# Patient Record
Sex: FEMALE | Race: WHITE | NOT HISPANIC OR LATINO | Employment: FULL TIME | URBAN - METROPOLITAN AREA
[De-identification: names, ages, dates, MRNs, and addresses within clinical notes are randomized per-mention and may not be internally consistent; named-entity substitution may affect disease eponyms.]

---

## 2017-01-23 ENCOUNTER — ALLSCRIPTS OFFICE VISIT (OUTPATIENT)
Dept: OTHER | Facility: OTHER | Age: 47
End: 2017-01-23

## 2018-01-13 VITALS
HEART RATE: 80 BPM | SYSTOLIC BLOOD PRESSURE: 118 MMHG | WEIGHT: 162.38 LBS | DIASTOLIC BLOOD PRESSURE: 82 MMHG | BODY MASS INDEX: 28.76 KG/M2

## 2019-01-09 ENCOUNTER — TELEPHONE (OUTPATIENT)
Dept: INTERNAL MEDICINE CLINIC | Facility: CLINIC | Age: 49
End: 2019-01-09

## 2019-04-25 ENCOUNTER — TELEPHONE (OUTPATIENT)
Dept: GASTROENTEROLOGY | Facility: CLINIC | Age: 49
End: 2019-04-25

## 2019-05-23 ENCOUNTER — TELEPHONE (OUTPATIENT)
Dept: GASTROENTEROLOGY | Facility: CLINIC | Age: 49
End: 2019-05-23

## 2019-06-21 ENCOUNTER — TRANSCRIBE ORDERS (OUTPATIENT)
Dept: LAB | Facility: CLINIC | Age: 49
End: 2019-06-21

## 2019-06-21 ENCOUNTER — APPOINTMENT (OUTPATIENT)
Dept: LAB | Facility: CLINIC | Age: 49
End: 2019-06-21
Payer: COMMERCIAL

## 2019-06-21 ENCOUNTER — OFFICE VISIT (OUTPATIENT)
Dept: INTERNAL MEDICINE CLINIC | Facility: CLINIC | Age: 49
End: 2019-06-21
Payer: COMMERCIAL

## 2019-06-21 VITALS
HEIGHT: 63 IN | TEMPERATURE: 98 F | SYSTOLIC BLOOD PRESSURE: 116 MMHG | DIASTOLIC BLOOD PRESSURE: 86 MMHG | BODY MASS INDEX: 31.08 KG/M2 | WEIGHT: 175.4 LBS | HEART RATE: 88 BPM

## 2019-06-21 DIAGNOSIS — K90.49 FOOD INTOLERANCE: ICD-10-CM

## 2019-06-21 DIAGNOSIS — R10.9 ABDOMINAL PAIN, UNSPECIFIED ABDOMINAL LOCATION: ICD-10-CM

## 2019-06-21 DIAGNOSIS — R10.9 ABDOMINAL PAIN, UNSPECIFIED ABDOMINAL LOCATION: Primary | ICD-10-CM

## 2019-06-21 LAB
ALBUMIN SERPL BCP-MCNC: 3.9 G/DL (ref 3.5–5)
ALP SERPL-CCNC: 57 U/L (ref 46–116)
ALT SERPL W P-5'-P-CCNC: 42 U/L (ref 12–78)
ANION GAP SERPL CALCULATED.3IONS-SCNC: 3 MMOL/L (ref 4–13)
AST SERPL W P-5'-P-CCNC: 22 U/L (ref 5–45)
BASOPHILS # BLD AUTO: 0.04 THOUSANDS/ΜL (ref 0–0.1)
BASOPHILS NFR BLD AUTO: 1 % (ref 0–1)
BILIRUB SERPL-MCNC: 0.48 MG/DL (ref 0.2–1)
BILIRUB UR QL STRIP: NEGATIVE
BUN SERPL-MCNC: 10 MG/DL (ref 5–25)
CALCIUM SERPL-MCNC: 9 MG/DL (ref 8.3–10.1)
CHLORIDE SERPL-SCNC: 104 MMOL/L (ref 100–108)
CHOLEST SERPL-MCNC: 186 MG/DL (ref 50–200)
CLARITY UR: NORMAL
CO2 SERPL-SCNC: 27 MMOL/L (ref 21–32)
COLOR UR: YELLOW
CREAT SERPL-MCNC: 0.74 MG/DL (ref 0.6–1.3)
EOSINOPHIL # BLD AUTO: 0.11 THOUSAND/ΜL (ref 0–0.61)
EOSINOPHIL NFR BLD AUTO: 2 % (ref 0–6)
ERYTHROCYTE [DISTWIDTH] IN BLOOD BY AUTOMATED COUNT: 12.5 % (ref 11.6–15.1)
GFR SERPL CREATININE-BSD FRML MDRD: 97 ML/MIN/1.73SQ M
GLUCOSE P FAST SERPL-MCNC: 93 MG/DL (ref 65–99)
GLUCOSE UR STRIP-MCNC: NEGATIVE MG/DL
HCT VFR BLD AUTO: 38.4 % (ref 34.8–46.1)
HDLC SERPL-MCNC: 62 MG/DL (ref 40–60)
HGB BLD-MCNC: 12.6 G/DL (ref 11.5–15.4)
HGB UR QL STRIP.AUTO: NEGATIVE
IMM GRANULOCYTES # BLD AUTO: 0.02 THOUSAND/UL (ref 0–0.2)
IMM GRANULOCYTES NFR BLD AUTO: 0 % (ref 0–2)
KETONES UR STRIP-MCNC: NEGATIVE MG/DL
LDLC SERPL CALC-MCNC: 87 MG/DL (ref 0–100)
LEUKOCYTE ESTERASE UR QL STRIP: NEGATIVE
LIPASE SERPL-CCNC: 114 U/L (ref 73–393)
LYMPHOCYTES # BLD AUTO: 1.26 THOUSANDS/ΜL (ref 0.6–4.47)
LYMPHOCYTES NFR BLD AUTO: 20 % (ref 14–44)
MCH RBC QN AUTO: 31.3 PG (ref 26.8–34.3)
MCHC RBC AUTO-ENTMCNC: 32.8 G/DL (ref 31.4–37.4)
MCV RBC AUTO: 95 FL (ref 82–98)
MONOCYTES # BLD AUTO: 0.59 THOUSAND/ΜL (ref 0.17–1.22)
MONOCYTES NFR BLD AUTO: 9 % (ref 4–12)
NEUTROPHILS # BLD AUTO: 4.41 THOUSANDS/ΜL (ref 1.85–7.62)
NEUTS SEG NFR BLD AUTO: 68 % (ref 43–75)
NITRITE UR QL STRIP: NEGATIVE
NONHDLC SERPL-MCNC: 124 MG/DL
NRBC BLD AUTO-RTO: 0 /100 WBCS
PH UR STRIP.AUTO: 6 [PH]
PLATELET # BLD AUTO: 331 THOUSANDS/UL (ref 149–390)
PMV BLD AUTO: 10.7 FL (ref 8.9–12.7)
POTASSIUM SERPL-SCNC: 3.7 MMOL/L (ref 3.5–5.3)
PROT SERPL-MCNC: 7.8 G/DL (ref 6.4–8.2)
PROT UR STRIP-MCNC: NEGATIVE MG/DL
RBC # BLD AUTO: 4.03 MILLION/UL (ref 3.81–5.12)
SODIUM SERPL-SCNC: 134 MMOL/L (ref 136–145)
SP GR UR STRIP.AUTO: 1.02 (ref 1–1.03)
TRIGL SERPL-MCNC: 184 MG/DL
TSH SERPL DL<=0.05 MIU/L-ACNC: 2.47 UIU/ML (ref 0.36–3.74)
UROBILINOGEN UR QL STRIP.AUTO: 0.2 E.U./DL
WBC # BLD AUTO: 6.43 THOUSAND/UL (ref 4.31–10.16)

## 2019-06-21 PROCEDURE — 36415 COLL VENOUS BLD VENIPUNCTURE: CPT

## 2019-06-21 PROCEDURE — 84443 ASSAY THYROID STIM HORMONE: CPT

## 2019-06-21 PROCEDURE — 85025 COMPLETE CBC W/AUTO DIFF WBC: CPT

## 2019-06-21 PROCEDURE — 3008F BODY MASS INDEX DOCD: CPT | Performed by: PHYSICIAN ASSISTANT

## 2019-06-21 PROCEDURE — 80061 LIPID PANEL: CPT

## 2019-06-21 PROCEDURE — 99214 OFFICE O/P EST MOD 30 MIN: CPT | Performed by: PHYSICIAN ASSISTANT

## 2019-06-21 PROCEDURE — 80053 COMPREHEN METABOLIC PANEL: CPT

## 2019-06-21 PROCEDURE — 83690 ASSAY OF LIPASE: CPT

## 2019-06-21 PROCEDURE — 81003 URINALYSIS AUTO W/O SCOPE: CPT | Performed by: PHYSICIAN ASSISTANT

## 2020-10-09 ENCOUNTER — OFFICE VISIT (OUTPATIENT)
Dept: FAMILY MEDICINE CLINIC | Facility: CLINIC | Age: 50
End: 2020-10-09
Payer: COMMERCIAL

## 2020-10-09 VITALS
SYSTOLIC BLOOD PRESSURE: 128 MMHG | DIASTOLIC BLOOD PRESSURE: 78 MMHG | WEIGHT: 159.8 LBS | HEIGHT: 63 IN | TEMPERATURE: 98.1 F | OXYGEN SATURATION: 99 % | BODY MASS INDEX: 28.31 KG/M2 | HEART RATE: 89 BPM

## 2020-10-09 DIAGNOSIS — T78.40XS ALLERGY, SEQUELA: ICD-10-CM

## 2020-10-09 DIAGNOSIS — Z12.31 ENCOUNTER FOR SCREENING MAMMOGRAM FOR MALIGNANT NEOPLASM OF BREAST: Primary | ICD-10-CM

## 2020-10-09 DIAGNOSIS — Z00.00 ANNUAL PHYSICAL EXAM: ICD-10-CM

## 2020-10-09 DIAGNOSIS — T78.40XS ALLERGIC DISORDER, SEQUELA: ICD-10-CM

## 2020-10-09 DIAGNOSIS — L81.2 FRECKLED SKIN: ICD-10-CM

## 2020-10-09 PROCEDURE — 1036F TOBACCO NON-USER: CPT | Performed by: STUDENT IN AN ORGANIZED HEALTH CARE EDUCATION/TRAINING PROGRAM

## 2020-10-09 PROCEDURE — 3725F SCREEN DEPRESSION PERFORMED: CPT | Performed by: STUDENT IN AN ORGANIZED HEALTH CARE EDUCATION/TRAINING PROGRAM

## 2020-10-09 PROCEDURE — 99396 PREV VISIT EST AGE 40-64: CPT | Performed by: STUDENT IN AN ORGANIZED HEALTH CARE EDUCATION/TRAINING PROGRAM

## 2020-10-14 ENCOUNTER — TELEPHONE (OUTPATIENT)
Dept: FAMILY MEDICINE CLINIC | Facility: CLINIC | Age: 50
End: 2020-10-14

## 2020-10-14 DIAGNOSIS — L29.9 PRURITUS: Primary | ICD-10-CM

## 2020-10-14 LAB
A ALTERNATA IGE QN: <0.1 KU/L
ALMOND IGE QN: <0.1 KU/L
C HERBARUM IGE QN: <0.1 KU/L
CASHEW NUT IGE QN: <0.1 KU/L
CAT DANDER IGE QN: <0.1 KU/L
CODFISH IGE QN: <0.1 KU/L
COMMON RAGWEED IGE QN: <0.1 KU/L
D FARINAE IGE QN: 1.08 KU/L
DEPRECATED A ALTERNATA IGE RAST QL: 0
DEPRECATED ALMOND IGE RAST QL: 0
DEPRECATED C HERBARUM IGE RAST QL: 0
DEPRECATED CASHEW NUT IGE RAST QL: 0
DEPRECATED CAT DANDER IGE RAST QL: 0
DEPRECATED CODFISH IGE RAST QL: 0
DEPRECATED COMMON RAGWEED IGE RAST QL: 0
DEPRECATED D FARINAE IGE RAST QL: 2
DEPRECATED DOG DANDER IGE RAST QL: 0
DEPRECATED EGG WHITE IGE RAST QL: 0
DEPRECATED GOOSEFOOT IGE RAST QL: 0
DEPRECATED HAZELNUT IGE RAST QL: 0
DEPRECATED KENT BLUE GRASS IGE RAST QL: 0
DEPRECATED MILK IGE RAST QL: ABNORMAL
DEPRECATED PEANUT IGE RAST QL: 0
DEPRECATED SALMON IGE RAST QL: 0
DEPRECATED SCALLOP IGE RAST QL: 1
DEPRECATED SESAME SEED IGE RAST QL: 0
DEPRECATED SHRIMP IGE RAST QL: 2
DEPRECATED SOYBEAN IGE RAST QL: 0
DEPRECATED TIMOTHY IGE RAST QL: 0
DEPRECATED TUNA IGE RAST QL: 0
DEPRECATED WALNUT IGE RAST QL: 0
DEPRECATED WHEAT IGE RAST QL: 0
DEPRECATED WHITE OAK IGE RAST QL: 0
DOG DANDER IGE QN: <0.1 KU/L
EGG WHITE IGE QN: <0.1 KU/L
GOOSEFOOT IGE QN: <0.1 KU/L
HAZELNUT IGE QN: <0.1 KU/L
KENT BLUE GRASS IGE QN: <0.1 KU/L
MILK IGE QN: 0.16 KU/L
PEANUT IGE QN: <0.1 KU/L
SALMON IGE QN: <0.1 KU/L
SCALLOP IGE QN: 0.54 KU/L
SESAME SEED IGE QN: <0.1 KU/L
SHRIMP IGE QN: 1.3 KU/L
SOYBEAN IGE QN: <0.1 KU/L
TIMOTHY IGE QN: <0.1 KU/L
TUNA IGE QN: <0.1 KU/L
WALNUT IGE QN: <0.1 KU/L
WHEAT IGE QN: <0.1 KU/L
WHITE OAK IGE QN: <0.1 KU/L

## 2020-10-14 RX ORDER — HYDROXYZINE HYDROCHLORIDE 25 MG/1
25 TABLET, FILM COATED ORAL EVERY 6 HOURS PRN
Qty: 90 TABLET | Refills: 0 | Status: SHIPPED | OUTPATIENT
Start: 2020-10-14 | End: 2020-10-29

## 2020-10-29 DIAGNOSIS — L29.9 PRURITUS: ICD-10-CM

## 2020-10-29 RX ORDER — HYDROXYZINE HYDROCHLORIDE 25 MG/1
25 TABLET, FILM COATED ORAL EVERY 6 HOURS PRN
Qty: 90 TABLET | Refills: 0 | Status: SHIPPED | OUTPATIENT
Start: 2020-10-29 | End: 2020-11-04

## 2020-11-04 PROBLEM — L50.1 CHRONIC IDIOPATHIC URTICARIA: Status: ACTIVE | Noted: 2020-11-04

## 2021-01-12 ENCOUNTER — TELEPHONE (OUTPATIENT)
Dept: FAMILY MEDICINE CLINIC | Facility: CLINIC | Age: 51
End: 2021-01-12

## 2021-01-12 NOTE — TELEPHONE ENCOUNTER
Patient called office stating she was seen here few months ago for a rash   Patient states the treatment plan she was given at the time of visit did not help her  Patient states she has now found dicyclomine in her medicine cabinet and have been taking it  It's now helping her rash get better she states  Patient states she's running low and would like for it to get refilled   After looking into chart I don't see this medication in chart  I told patient she needs an appointment in office patient declined stating she does not want a appointment due to us not helping her rash the first time

## 2021-01-28 ENCOUNTER — TELEPHONE (OUTPATIENT)
Dept: FAMILY MEDICINE CLINIC | Facility: CLINIC | Age: 51
End: 2021-01-28

## 2021-01-28 ENCOUNTER — OFFICE VISIT (OUTPATIENT)
Dept: FAMILY MEDICINE CLINIC | Facility: CLINIC | Age: 51
End: 2021-01-28
Payer: COMMERCIAL

## 2021-01-28 VITALS
WEIGHT: 160 LBS | HEART RATE: 74 BPM | HEIGHT: 63 IN | TEMPERATURE: 98 F | OXYGEN SATURATION: 100 % | DIASTOLIC BLOOD PRESSURE: 74 MMHG | BODY MASS INDEX: 28.35 KG/M2 | SYSTOLIC BLOOD PRESSURE: 122 MMHG

## 2021-01-28 DIAGNOSIS — L50.9 URTICARIA OF UNKNOWN ORIGIN: Primary | ICD-10-CM

## 2021-01-28 DIAGNOSIS — L50.1 CHRONIC IDIOPATHIC URTICARIA: ICD-10-CM

## 2021-01-28 PROCEDURE — 3725F SCREEN DEPRESSION PERFORMED: CPT | Performed by: STUDENT IN AN ORGANIZED HEALTH CARE EDUCATION/TRAINING PROGRAM

## 2021-01-28 PROCEDURE — 3008F BODY MASS INDEX DOCD: CPT | Performed by: STUDENT IN AN ORGANIZED HEALTH CARE EDUCATION/TRAINING PROGRAM

## 2021-01-28 PROCEDURE — 99213 OFFICE O/P EST LOW 20 MIN: CPT | Performed by: STUDENT IN AN ORGANIZED HEALTH CARE EDUCATION/TRAINING PROGRAM

## 2021-01-28 PROCEDURE — 1036F TOBACCO NON-USER: CPT | Performed by: STUDENT IN AN ORGANIZED HEALTH CARE EDUCATION/TRAINING PROGRAM

## 2021-01-28 RX ORDER — DOXYCYCLINE HYCLATE 100 MG/1
100 CAPSULE ORAL EVERY 12 HOURS SCHEDULED
Qty: 28 CAPSULE | Refills: 0 | Status: SHIPPED | OUTPATIENT
Start: 2021-01-28 | End: 2021-02-11

## 2021-01-28 NOTE — PROGRESS NOTES
Assessment/Plan:         Problem List Items Addressed This Visit        Musculoskeletal and Integument    Chronic idiopathic urticaria      Other Visit Diagnoses     Urticaria of unknown origin    -  Primary    Relevant Medications    doxycycline hyclate (VIBRAMYCIN) 100 mg capsule        Given the inflammatory nature and improvement with doxy, will do a short course  Discussed if no improvement, will need follow up with derm     Subjective:      Patient ID: Felipe Matamoros is a 52 y o  female  Rash  This is a chronic problem  The current episode started more than 1 year ago  The problem has been gradually improving since onset  The rash is diffuse  The rash is characterized by dryness, itchiness, redness and swelling  It is unknown if there was an exposure to a precipitant  Pertinent negatives include no anorexia, congestion, cough, diarrhea, eye pain, facial edema, fatigue, fever, joint pain, nail changes, rhinorrhea, shortness of breath, sore throat or vomiting  Past treatments include antibiotics and moisturizer  The treatment provided significant relief  Her past medical history is significant for allergies  There is no history of asthma, eczema or varicella  The following portions of the patient's history were reviewed and updated as appropriate:   Past Medical History:  She has a past medical history of Anemia, Heartburn, History of chickenpox, Hives, Pneumonia, Wears contact lenses, and Wears glasses  ,  _______________________________________________________________________  Medical Problems:  does not have any pertinent problems on file ,  _______________________________________________________________________  Past Surgical History:   has a past surgical history that includes  section; Knee surgery; Breast surgery; Abdominal surgery; Tonsillectomy and adenoidectomy (2015); Colonoscopy;  [place] tooth extraction; and Tubal ligation  ,  _______________________________________________________________________  Family History:  family history includes Alcohol abuse in her daughter; Cancer in her mother; Colon cancer in her mother; Crohn's disease in her sister; No Known Problems in her father; Other in her daughter; Psoriasis in her sister  ,  _______________________________________________________________________  Social History:   reports that she quit smoking about 7 years ago  Her smoking use included cigarettes  She has a 25 00 pack-year smoking history  She has never used smokeless tobacco  She reports current alcohol use  She reports that she does not use drugs  ,  _______________________________________________________________________  Allergies:  has No Known Allergies     _______________________________________________________________________  Current Outpatient Medications   Medication Sig Dispense Refill    doxycycline hyclate (VIBRAMYCIN) 100 mg capsule Take 1 capsule (100 mg total) by mouth every 12 (twelve) hours for 14 days 28 capsule 0    Quercetin 250 MG TABS Take 1 tablet (250 mg total) by mouth 2 (two) times a day 60 tablet 11    saccharomyces boulardii (FLORASTOR) 250 mg capsule Take 250 mg by mouth 2 (two) times a day       No current facility-administered medications for this visit       _______________________________________________________________________  Review of Systems   Constitutional: Negative for fatigue and fever  HENT: Negative for congestion, rhinorrhea and sore throat  Eyes: Negative for pain  Respiratory: Negative for cough and shortness of breath  Gastrointestinal: Negative for anorexia, diarrhea and vomiting  Musculoskeletal: Negative for joint pain  Skin: Positive for rash  Negative for nail changes           Objective:  Vitals:    01/28/21 0810   BP: 122/74   BP Location: Left arm   Patient Position: Sitting   Pulse: 74   Temp: 98 °F (36 7 °C)   TempSrc: Tympanic   SpO2: 100% Weight: 72 6 kg (160 lb)   Height: 5' 3" (1 6 m)     Body mass index is 28 34 kg/m²  Physical Exam  Constitutional:       General: She is not in acute distress  Appearance: Normal appearance  She is normal weight  She is not ill-appearing  Skin:     Coloration: Skin is not ashen, cyanotic, jaundiced, mottled, pale or sallow  Findings: Rash present  Rash is urticarial       Comments: Healing urticarial rash on arms and trunk, inflammatory changes now Absent   Neurological:      General: No focal deficit present  Mental Status: She is alert and oriented to person, place, and time  Mental status is at baseline     Psychiatric:         Mood and Affect: Mood normal

## 2021-01-28 NOTE — TELEPHONE ENCOUNTER
Pt arrived for appt today and her insurance has her assigned to a different doctor  Pt will call insurance to change PCP  Aware without changing she will be charged for her visit

## 2021-04-29 ENCOUNTER — TELEPHONE (OUTPATIENT)
Dept: FAMILY MEDICINE CLINIC | Facility: CLINIC | Age: 51
End: 2021-04-29

## 2021-04-29 NOTE — TELEPHONE ENCOUNTER
T/c from patient stating she has been breaking out in hives  She has decreased her carbs and hives are much better  She is wondering if she could be tested for celiac disease  She would appreciate one of our other providers to review in Dr Gillian Brito absence  Thank you

## 2021-04-30 NOTE — TELEPHONE ENCOUNTER
Celiac disease does not typically cause hives, more associated with dermatitis herpetiformis rash  Per Dr Vipin Beltrán last note, patient should follow up with derm if things are not improving  She can discuss further with Dr Vipin Beltrán if she has other symptoms that are concerning her for celiac disease       DO [facility]  4/30/2021 7:56 AM

## 2021-04-30 NOTE — TELEPHONE ENCOUNTER
Pt called regarding labs being ordered to test her for celiac disease  Upon providing pt with Dr Valerio Saenz advisement, pt got very upset that blood test would not be ordered at this time  I asked pt if this could wait until Dr Lexi Nance returns on Monday? Pt states she would like a call from Dr Lexi Nance and hung up on me

## 2021-06-03 ENCOUNTER — TRANSCRIBE ORDERS (OUTPATIENT)
Dept: ADMINISTRATIVE | Facility: HOSPITAL | Age: 51
End: 2021-06-03

## 2021-06-03 DIAGNOSIS — R60.9 SWELLING: Primary | ICD-10-CM

## 2021-06-10 ENCOUNTER — HOSPITAL ENCOUNTER (OUTPATIENT)
Dept: ULTRASOUND IMAGING | Facility: CLINIC | Age: 51
Discharge: HOME | End: 2021-06-10
Payer: COMMERCIAL

## 2021-06-10 DIAGNOSIS — R60.9 SWELLING: ICD-10-CM

## 2021-06-10 PROCEDURE — 76536 US EXAM OF HEAD AND NECK: CPT

## 2021-08-26 ENCOUNTER — OFFICE VISIT (OUTPATIENT)
Dept: GASTROENTEROLOGY | Facility: CLINIC | Age: 51
End: 2021-08-26
Payer: COMMERCIAL

## 2021-08-26 ENCOUNTER — TELEPHONE (OUTPATIENT)
Dept: GASTROENTEROLOGY | Facility: CLINIC | Age: 51
End: 2021-08-26

## 2021-08-26 VITALS
HEIGHT: 63 IN | DIASTOLIC BLOOD PRESSURE: 82 MMHG | SYSTOLIC BLOOD PRESSURE: 120 MMHG | HEART RATE: 77 BPM | WEIGHT: 147 LBS | BODY MASS INDEX: 26.05 KG/M2

## 2021-08-26 DIAGNOSIS — R19.7 DIARRHEA, UNSPECIFIED TYPE: Primary | ICD-10-CM

## 2021-08-26 DIAGNOSIS — K21.9 GASTROESOPHAGEAL REFLUX DISEASE WITHOUT ESOPHAGITIS: ICD-10-CM

## 2021-08-26 DIAGNOSIS — R09.89 GLOBUS SENSATION: ICD-10-CM

## 2021-08-26 PROCEDURE — 3008F BODY MASS INDEX DOCD: CPT | Performed by: PHYSICIAN ASSISTANT

## 2021-08-26 PROCEDURE — 99214 OFFICE O/P EST MOD 30 MIN: CPT | Performed by: PHYSICIAN ASSISTANT

## 2021-08-26 PROCEDURE — 1036F TOBACCO NON-USER: CPT | Performed by: PHYSICIAN ASSISTANT

## 2021-08-26 RX ORDER — FAMOTIDINE 20 MG/1
20 TABLET, FILM COATED ORAL 2 TIMES DAILY
Qty: 60 TABLET | Refills: 1 | Status: SHIPPED | OUTPATIENT
Start: 2021-08-26 | End: 2021-09-25

## 2021-08-26 NOTE — TELEPHONE ENCOUNTER
Spoke with [facility], they stated her insurance will not cover the medication at all because the pt can buy it otc

## 2021-08-26 NOTE — PROGRESS NOTES
[facility] - Outpatient Follow-up Note  Darren Piedra 54 y o  female MRN: [id number]  Encounter: [id number]          ASSESSMENT AND PLAN:      1  Gastroesophageal reflux disease without esophagitis  2  Globus sensation  3  Diarrhea    Patient reports of struggling with a globus sensation for months  She tried Prilosec which caused headaches  She also has a long history of diarrhea x years and has a sister with crohn's disease and mother with colon cancer  She had a colonoscopy with Dr Ted Montes for this complaint within the past 2 years which she reports was normal   She also had extensive food allergy testing which came back for numerous food allergies which she is now avoiding  It did include an allergy to gluten  She has been gluten free for a couple of months now with a dramatic improvement in her diarrhea  Will plan for EGD to investigate for esophagitis, ulcers, etc  - will bx the stomach for h pylori and duodenum  We did review the evaluation for celiac disease with celiac serology and duodenal biopsies which would require her to return to a gluten-rich diet for accurate testing  Given that she was found to have an allergy to gluten and has dramatically improved with a gluten-free diet, she would like to avoid returning to eating gluten for that testing  Recommend to continue a strict gluten free diet  Will check SIBO testing and CT enterography given family history of crohn's disease  Will begin a Pepcid 20mg po BID x 6 weeks course for GERD  Follow up after above testing   ______________________________________________________________________    SUBJECTIVE:  Patient is a 54year old female who presents to the office for a gastrointestinal evaluation  Patient reports that she has been struggling with a globus sensation for months  She tried Prilosec which caused headaches    She also has a long history of diarrhea x years and has a sister with crohn's disease and mother with colon cancer  She had a colonoscopy with Dr Shawn Grace for this complaint within the past 2 years which she reports was normal   She also had extensive food allergy testing from an outside lab which came back for numerous food allergies which she is now avoiding  It did include an allergy to gluten  She has been gluten free for a couple of months now with a dramatic improvement in her diarrhea  She reports she had been struggling with recurrent hives as well which prompted her food allergy testing  She does report some abdominal bloating and discomfort at times  She reports she would like to be tested for h pylori and SIBO  REVIEW OF SYSTEMS IS OTHERWISE NEGATIVE        Historical Information   Past Medical History:   Diagnosis Date    Anemia     resolved    Heartburn     History of chickenpox     Hives     Pneumonia     Wears contact lenses     Wears glasses      Past Surgical History:   Procedure Laterality Date    ABDOMINAL SURGERY      Tummy Tuck ( Abdominal Plasty)    BREAST SURGERY       SECTION      COLONOSCOPY      KNEE SURGERY      TONSILLECTOMY AND ADENOIDECTOMY  2015    Last assessed    TUBAL LIGATION      WISDOM TOOTH EXTRACTION      x4     Social History   Social History     Substance and Sexual Activity   Alcohol Use Yes     Social History     Substance and Sexual Activity   Drug Use Never     Social History     Tobacco Use   Smoking Status Former Smoker    Packs/day: 1 00    Years: 25 00    Pack years: 25 00    Types: Cigarettes    Quit date:     Years since quittin 6   Smokeless Tobacco Never Used   Tobacco Comment    Quit 9 years      Family History   Problem Relation Age of Onset   [facility] Cancer Mother     Colon cancer Mother    Rene Ventura Crohn's disease Sister     Psoriasis Sister     No Known Problems Father     Alcohol abuse Daughter     Other Daughter         sensitive to bug bites        Meds/Allergies       Current IgE 10/09/2020 <0 10     Class 10/09/2020 0     S922-OmZ [place] 10/09/2020 <0 10     Class 10/09/2020 0     Codfish IgE 10/09/2020 <0 10     Class 10/09/2020 0     Cow's Milk (F2) IgE 10/09/2020 0 16*    Class 10/09/2020 0/1     C663-CrY Soybean 10/09/2020 <0 10     Class 10/09/2020 0     Shrimp IgE 10/09/2020 1 30*    Class 10/09/2020 2     SCALLOP IGE 10/09/2020 0 54*    Class 10/09/2020 1     W665-StX Sesame Seed 10/09/2020 <0 10     Class 10/09/2020 0     H301-KcO Hazelnut (Filbe* 10/09/2020 <0 10     Class 10/09/2020 0     K347-KzC Cashew Nut 10/09/2020 <0 10     Class 10/09/2020 0     C085-XvS [place] 10/09/2020 <0 10     Class 10/09/2020 0     [place] 10/09/2020 <0 10     Class 10/09/2020 0     Tuna 10/09/2020 <0 10     Class 10/09/2020 0          Radiology Results:   No results found

## 2021-08-26 NOTE — H&P (VIEW-ONLY)
[facility] - Outpatient Follow-up Note  Val Santos 54 y o  female MRN: [id number]  Encounter: [id number]          ASSESSMENT AND PLAN:      1  Gastroesophageal reflux disease without esophagitis  2  Globus sensation  3  Diarrhea    Patient reports of struggling with a globus sensation for months  She tried Prilosec which caused headaches  She also has a long history of diarrhea x years and has a sister with crohn's disease and mother with colon cancer  She had a colonoscopy with Dr Ashley Chino for this complaint within the past 2 years which she reports was normal   She also had extensive food allergy testing which came back for numerous food allergies which she is now avoiding  It did include an allergy to gluten  She has been gluten free for a couple of months now with a dramatic improvement in her diarrhea  Will plan for EGD to investigate for esophagitis, ulcers, etc  - will bx the stomach for h pylori and duodenum  We did review the evaluation for celiac disease with celiac serology and duodenal biopsies which would require her to return to a gluten-rich diet for accurate testing  Given that she was found to have an allergy to gluten and has dramatically improved with a gluten-free diet, she would like to avoid returning to eating gluten for that testing  Recommend to continue a strict gluten free diet  Will check SIBO testing and CT enterography given family history of crohn's disease  Will begin a Pepcid 20mg po BID x 6 weeks course for GERD  Follow up after above testing   ______________________________________________________________________    SUBJECTIVE:  Patient is a 54year old female who presents to the office for a gastrointestinal evaluation  Patient reports that she has been struggling with a globus sensation for months  She tried Prilosec which caused headaches    She also has a long history of diarrhea x years and has a sister with crohn's disease and mother with colon cancer  She had a colonoscopy with Dr Sia Fernandez for this complaint within the past 2 years which she reports was normal   She also had extensive food allergy testing from an outside lab which came back for numerous food allergies which she is now avoiding  It did include an allergy to gluten  She has been gluten free for a couple of months now with a dramatic improvement in her diarrhea  She reports she had been struggling with recurrent hives as well which prompted her food allergy testing  She does report some abdominal bloating and discomfort at times  She reports she would like to be tested for h pylori and SIBO  REVIEW OF SYSTEMS IS OTHERWISE NEGATIVE        Historical Information   Past Medical History:   Diagnosis Date    Anemia     resolved    Heartburn     History of chickenpox     Hives     Pneumonia     Wears contact lenses     Wears glasses      Past Surgical History:   Procedure Laterality Date    ABDOMINAL SURGERY      Tummy Tuck ( Abdominal Plasty)    BREAST SURGERY       SECTION      COLONOSCOPY      KNEE SURGERY      TONSILLECTOMY AND ADENOIDECTOMY  2015    Last assessed    TUBAL LIGATION      WISDOM TOOTH EXTRACTION      x4     Social History   Social History     Substance and Sexual Activity   Alcohol Use Yes     Social History     Substance and Sexual Activity   Drug Use Never     Social History     Tobacco Use   Smoking Status Former Smoker    Packs/day: 1 00    Years: 25 00    Pack years: 25 00    Types: Cigarettes    Quit date:     Years since quittin 6   Smokeless Tobacco Never Used   Tobacco Comment    Quit 9 years      Family History   Problem Relation Age of Onset   [facility] Cancer Mother     Colon cancer Mother    [facility] Crohn's disease Sister     Psoriasis Sister     No Known Problems Father     Alcohol abuse Daughter     Other Daughter         sensitive to bug bites        Meds/Allergies       Current Outpatient Medications:     VITAMIN D PO    famotidine (PEPCID) 20 mg tablet    Quercetin 250 MG TABS    saccharomyces boulardii (FLORASTOR) 250 mg capsule    No Known Allergies        Objective     Blood pressure 120/82, pulse 77, height 5' 3" (1 6 m), weight 66 7 kg (147 lb)  Body mass index is 26 04 kg/m²  PHYSICAL EXAM:      General Appearance:   Alert, cooperative, no distress   HEENT:   Normocephalic, atraumatic, anicteric    Neck:  Supple, symmetrical, trachea midline   Lungs:   Clear to auscultation bilaterally; no rales, rhonchi or wheezing; respirations unlabored    Heart[de-identified]   Regular rate and rhythm; no murmur, rub, or gallop  Abdomen:   Soft, non-tender, non-distended; normal bowel sounds; no masses, no organomegaly    Genitalia:   Deferred    Rectal:   Deferred    Extremities:  No cyanosis, clubbing or edema    Pulses:  2+ and symmetric    Skin:  No jaundice, rashes, or lesions    Lymph nodes:  No palpable cervical lymphadenopathy        Lab Results:   No visits with results within 1 Day(s) from this visit  Latest known visit with results is:   Orders Only on 10/09/2020   Component Date Value    Alternaria Alternata 10/09/2020 <0 10     Class 10/09/2020 0     Cat Dander 10/09/2020 <0 10     Class 10/09/2020 0     Cladosporium Herbarum 10/09/2020 <0 10     Class 10/09/2020 0     Short Ragwd(A sharri ) * 10/09/2020 <0 10     Class 10/09/2020 0     D   FARINAE 10/09/2020 1 08*    Class 10/09/2020 2     DOG DANDER IGE 10/09/2020 <0 10     Class 10/09/2020 0     [place] 10/09/2020 <0 10     Class 10/09/2020 0     M866-ZvP [place] 10/09/2020 <0 10     Class 10/09/2020 0     T007 [place] 10/09/2020 <0 10     Class 10/09/2020 0     SARITA GRASS 10/09/2020 <0 10     Class 10/09/2020 0     Interpretation 10/09/2020      X724-OaT Egg [place] 10/09/2020 <0 10     Egg White IgE RAST 10/09/2020 0     Y691-SgA Peanut 10/09/2020 <0 10     Class 10/09/2020 0     Wheat IgE 10/09/2020 <0 10     Class 10/09/2020 0     F646-WmF [place] 10/09/2020 <0 10     Class 10/09/2020 0     Codfish IgE 10/09/2020 <0 10     Class 10/09/2020 0     Cow's Milk (F2) IgE 10/09/2020 0 16*    Class 10/09/2020 0/1     W670-OwN Soybean 10/09/2020 <0 10     Class 10/09/2020 0     Shrimp IgE 10/09/2020 1 30*    Class 10/09/2020 2     SCALLOP IGE 10/09/2020 0 54*    Class 10/09/2020 1     G980-XlW Sesame Seed 10/09/2020 <0 10     Class 10/09/2020 0     G345-OtC Hazelnut (Filbe* 10/09/2020 <0 10     Class 10/09/2020 0     K429-IqH Cashew Nut 10/09/2020 <0 10     Class 10/09/2020 0     R201-OiU [place] 10/09/2020 <0 10     Class 10/09/2020 0     [place] 10/09/2020 <0 10     Class 10/09/2020 0     Tuna 10/09/2020 <0 10     Class 10/09/2020 0          Radiology Results:   No results found

## 2021-08-26 NOTE — TELEPHONE ENCOUNTER
Pt called stating famotidine was sent to her pharmacy but she was informed she needs an alternative

## 2021-08-26 NOTE — TELEPHONE ENCOUNTER
Called the pt back , informed her that her insurance company will not cover the medication because it is otc  Informed her to  pepcid otc and take 20 mg twice a day, she agreed

## 2021-08-31 ENCOUNTER — TELEPHONE (OUTPATIENT)
Dept: GASTROENTEROLOGY | Facility: CLINIC | Age: 51
End: 2021-08-31

## 2021-08-31 NOTE — TELEPHONE ENCOUNTER
PT WANTED INFO ON ANESTHESIA I GAVE HER PRE ENCOUNTERS # AS I DO NOT KNOW WHO WILL BE WORKING DAY OF PROCEDURE

## 2021-09-08 ENCOUNTER — TELEPHONE (OUTPATIENT)
Dept: GASTROENTEROLOGY | Facility: HOSPITAL | Age: 51
End: 2021-09-08

## 2021-09-09 ENCOUNTER — HOSPITAL ENCOUNTER (OUTPATIENT)
Dept: GASTROENTEROLOGY | Facility: HOSPITAL | Age: 51
Setting detail: OUTPATIENT SURGERY
Discharge: HOME | End: 2021-09-09
Attending: INTERNAL MEDICINE | Admitting: INTERNAL MEDICINE
Payer: COMMERCIAL

## 2021-09-09 ENCOUNTER — ANESTHESIA EVENT (OUTPATIENT)
Dept: GASTROENTEROLOGY | Facility: HOSPITAL | Age: 51
End: 2021-09-09

## 2021-09-09 ENCOUNTER — ANESTHESIA (OUTPATIENT)
Dept: GASTROENTEROLOGY | Facility: HOSPITAL | Age: 51
End: 2021-09-09

## 2021-09-09 VITALS
TEMPERATURE: 97.1 F | HEART RATE: 67 BPM | SYSTOLIC BLOOD PRESSURE: 130 MMHG | DIASTOLIC BLOOD PRESSURE: 72 MMHG | RESPIRATION RATE: 17 BRPM | HEIGHT: 63 IN | WEIGHT: 146.83 LBS | BODY MASS INDEX: 26.02 KG/M2 | OXYGEN SATURATION: 100 %

## 2021-09-09 DIAGNOSIS — K21.9 GASTROESOPHAGEAL REFLUX DISEASE WITHOUT ESOPHAGITIS: ICD-10-CM

## 2021-09-09 LAB
EXT PREGNANCY TEST URINE: NEGATIVE
EXT. CONTROL: NORMAL

## 2021-09-09 PROCEDURE — 88305 TISSUE EXAM BY PATHOLOGIST: CPT | Performed by: PATHOLOGY

## 2021-09-09 PROCEDURE — 81025 URINE PREGNANCY TEST: CPT | Performed by: ANESTHESIOLOGY

## 2021-09-09 PROCEDURE — 43248 EGD GUIDE WIRE INSERTION: CPT | Performed by: INTERNAL MEDICINE

## 2021-09-09 PROCEDURE — 43239 EGD BIOPSY SINGLE/MULTIPLE: CPT | Performed by: INTERNAL MEDICINE

## 2021-09-09 RX ORDER — LIDOCAINE HYDROCHLORIDE 10 MG/ML
INJECTION, SOLUTION EPIDURAL; INFILTRATION; INTRACAUDAL; PERINEURAL AS NEEDED
Status: DISCONTINUED | OUTPATIENT
Start: 2021-09-09 | End: 2021-09-09

## 2021-09-09 RX ORDER — PROPOFOL 10 MG/ML
INJECTION, EMULSION INTRAVENOUS AS NEEDED
Status: DISCONTINUED | OUTPATIENT
Start: 2021-09-09 | End: 2021-09-09

## 2021-09-09 RX ORDER — SODIUM CHLORIDE, SODIUM LACTATE, POTASSIUM CHLORIDE, CALCIUM CHLORIDE 600; 310; 30; 20 MG/100ML; MG/100ML; MG/100ML; MG/100ML
125 INJECTION, SOLUTION INTRAVENOUS CONTINUOUS
Status: DISCONTINUED | OUTPATIENT
Start: 2021-09-09 | End: 2021-09-13 | Stop reason: HOSPADM

## 2021-09-09 RX ADMIN — SODIUM CHLORIDE, SODIUM LACTATE, POTASSIUM CHLORIDE, AND CALCIUM CHLORIDE 125 ML/HR: .6; .31; .03; .02 INJECTION, SOLUTION INTRAVENOUS at 09:08

## 2021-09-09 RX ADMIN — PROPOFOL 40 MG: 10 INJECTION, EMULSION INTRAVENOUS at 09:24

## 2021-09-09 RX ADMIN — PROPOFOL 40 MG: 10 INJECTION, EMULSION INTRAVENOUS at 09:25

## 2021-09-09 RX ADMIN — PROPOFOL 20 MG: 10 INJECTION, EMULSION INTRAVENOUS at 09:26

## 2021-09-09 RX ADMIN — LIDOCAINE HYDROCHLORIDE 100 MG: 10 INJECTION, SOLUTION EPIDURAL; INFILTRATION; INTRACAUDAL; PERINEURAL at 09:22

## 2021-09-09 NOTE — ANESTHESIA POSTPROCEDURE EVALUATION
Post-Op Assessment Note    CV Status:  Stable  Pain Score: 0    Pain management: adequate     Mental Status:  Alert and awake   Hydration Status:  Euvolemic   PONV Controlled:  Controlled   Airway Patency:  Patent      Post Op Vitals Reviewed: Yes      Staff: CRNA         No complications documented      BP   117/63   Temp 98   Pulse 73   Resp 16   SpO2 99

## 2021-09-09 NOTE — ANESTHESIA PREPROCEDURE EVALUATION
Procedure:  EGD    Relevant Problems   GI/HEPATIC   (+) Gastroesophageal reflux disease      Former smoker  Physical Exam    Airway    Mallampati score: I  TM Distance: >3 FB  Neck ROM: full     Dental   Comment: Denies loose teeth,     Cardiovascular  Cardiovascular exam normal    Pulmonary  Pulmonary exam normal     Other Findings  Portions of exam deferred due to low yield and/or unknown COVID status      Anesthesia Plan  ASA Score- 2     Anesthesia Type- IV sedation with anesthesia with ASA Monitors  Additional Monitors:   Airway Plan:           Plan Factors-Exercise tolerance (METS): >4 METS  Chart reviewed  Existing labs reviewed  Patient summary reviewed  Patient is not a current smoker  Induction- intravenous  Postoperative Plan-     Informed Consent- Anesthetic plan and risks discussed with patient  I personally reviewed this patient with the CRNA  Discussed and agreed on the Anesthesia Plan with the CRNA  Ellyn Das

## 2021-09-09 NOTE — INTERVAL H&P NOTE
H&P reviewed  After examining the patient I find no changes in the patients condition since the H&P had been written      Vitals:    09/09/21 0857   BP: 134/72   Pulse: 74   Resp: 22   Temp: 97 5 °F (36 4 °C)   SpO2: 99%

## 2021-09-16 ENCOUNTER — TELEPHONE (OUTPATIENT)
Dept: GASTROENTEROLOGY | Facility: CLINIC | Age: 51
End: 2021-09-16

## 2021-09-17 ENCOUNTER — TELEPHONE (OUTPATIENT)
Dept: GASTROENTEROLOGY | Facility: CLINIC | Age: 51
End: 2021-09-17

## 2021-09-24 ENCOUNTER — TELEPHONE (OUTPATIENT)
Dept: INTERNAL MEDICINE CLINIC | Facility: CLINIC | Age: 51
End: 2021-09-24

## 2021-09-24 NOTE — TELEPHONE ENCOUNTER
Former pt of Dr Rollo Sever said he referred her to get her tonsils removed, because she had recurrent sore throat   which she did 06/02/2015, listed in hr surgical hx    She wants to know the name of the Dr that removed them because she is having throat problems and wants to consult with him          [phone] (H)

## 2023-05-17 ENCOUNTER — TELEPHONE (OUTPATIENT)
Dept: FAMILY MEDICINE CLINIC | Facility: CLINIC | Age: 53
End: 2023-05-17

## 2023-05-17 NOTE — TELEPHONE ENCOUNTER
Patient not seen in over 2 years  Please contact patient and get her scheduled and if no longer under our care, please send to [facility] to remove us as pcp   Thank you

## 2023-06-02 ENCOUNTER — TELEPHONE (OUTPATIENT)
Dept: FAMILY MEDICINE CLINIC | Facility: CLINIC | Age: 53
End: 2023-06-02

## 2023-11-06 ENCOUNTER — TELEPHONE (OUTPATIENT)
Age: 53
End: 2023-11-06

## 2023-11-06 NOTE — TELEPHONE ENCOUNTER
Patient called to get information an a February appt she believed she had patrick she couldn't remember when, where or with whom. I looked in her chart and she was not scheduled for February. When I advised her she said she was taking us off her list and hung up.

## 2024-04-09 ENCOUNTER — TELEPHONE (OUTPATIENT)
Age: 54
End: 2024-04-09

## 2025-04-16 ENCOUNTER — TELEPHONE (OUTPATIENT)
Age: 55
End: 2025-04-16

## 2025-04-16 NOTE — TELEPHONE ENCOUNTER
Patients GI provider:  Dr. Barros    Number to return call: [phone]    Reason for call: Dr. Connell's office  calling to ask for the pts colonoscopy report from 5years ago be faxed to the office at [phone] if there are any questions you can call the office at [phone]    Scheduled procedure/appointment date if applicable: Apt/procedure

## 2025-05-29 ENCOUNTER — TELEPHONE (OUTPATIENT)
Age: 55
End: 2025-05-29

## 2025-05-29 NOTE — TELEPHONE ENCOUNTER
Pt called stating she received a letter she was due for her colonoscopy. She now has [employer] [facility] EPO. So she will not be able to have her procedure with us.